# Patient Record
Sex: MALE | Race: ASIAN | NOT HISPANIC OR LATINO | Employment: FULL TIME | URBAN - METROPOLITAN AREA
[De-identification: names, ages, dates, MRNs, and addresses within clinical notes are randomized per-mention and may not be internally consistent; named-entity substitution may affect disease eponyms.]

---
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
## 2021-05-31 VITALS — WEIGHT: 116 LBS | BODY MASS INDEX: 19.33 KG/M2 | HEIGHT: 65 IN

## 2021-06-12 NOTE — PROGRESS NOTES
Right knee problem five years ago.   Track meet.  Spikes caught on block. Rich and felt a crack when pushing off.  Right knee.  Was last in the race.  Jogged at 20 percent.   Was normal after the meet.  Since, has not run full speed.  Hurts at full speed and stand for longer than 6 hours will hurt.  Denies inflammation.  Denies locking.  Never give way.  Tried running [place] and had to stop.    Now in college    ROS: as noted above    OBJECTIVE:   Vitals:    08/21/17 1517   BP: 132/62   Pulse: 66   Resp: 20   Temp: 98.2  F (36.8  C)      Head: atraumatic   Eyes: nl eom, anicteric   Ears: nl external ears   Neck: nl nodes, supple   Lungs: clear to ausc   Heart: regular rhythm  Back: no tenderness  Abd: soft nontender   Joints: uninflamed   Ext: nontender calves   Mental: euthymic  Neuro: no weakness  Gait: normal  Knee full rom  No effusion or laxity or erythema  Neg meniscal signs  Xray ordered and interpreted personally: no acute.  ? Posterior ossicle could be old  ASSESSMENT/PLAN:    1. Chronic pain of right knee  Ambulatory referral to PT/OT    XR Knee Right 1 or 2 VWS   2. Immunization due  HPV vaccine 9 valent 3 dose IM     Suggest physical therapy.  follow up if not resolved sufficiently

## 2021-06-12 NOTE — PROGRESS NOTES
[facility]   Knee Initial Evaluation    Patient Name: Chepe Pryor  Date of evaluation: 8/25/2017  Referral Diagnosis: Chronic right knee pain  Referring provider: Jani Hendrickson MD  Visit Diagnosis:     ICD-10-CM    1. Chronic pain of right knee M25.561     G89.29    2. Muscle weakness (generalized) M62.81        Assessment:      Impairments in  pain, posture, ROM, joint mobility, strength  The POC is dynamic and will be modified on an ongoing basis.  Barriers to achieving goals as noted in the assessment section may affect outcome.  Prognosis to achieve goals is  good   Pt. is appropriate for skilled PT intervention as outlined in the Plan of Care (POC).  Pt. is a good candidate for skilled PT services to improve pain levels and function.     Chepe Pryor is a 17 y.o. 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 Not getting better and not getting worse. Patient presents with mild weakness of the proximal muscles of the LEs and positive mensical tear testing. Patient will benefit from skilled PT intervention to increase strength and stabilization to decrease pain and prevent reoccurrence of pain or injury.     Goals:  Pt. will be independent with home exercise program in : 6 weeks  Patient will ascend / descend: stairs;without railing;with recipricol gait;with less pain;with less difficulty  Pt will: will be able to return to jogging short distances wihtout increase in pain in 8 weeks  Pt will: be able to perform a deep knee bend without increase in knee pain for ADLs and self cares in 8 weks    Patient's expectations/goals are realistic.    Barriers to Learning or Achieving Goals:  Chronicity of the problem.  Co-morbidities or other medical factors.  none       Plan / Patient Instructions:       Plan of Care:   Authorization / Certification Start Date: 08/25/17  Communication with: Referral Source;Patient Caregiver  Patient Related Instruction: Nature of Condition;Treatment plan and rationale;Self Care instruction;Basis of treatment;Body mechanics;Posture;Precautions;Next steps;Expected outcome  Times per Week: 1  Number of Weeks: 8   Number of Visits: up to 8 sessions  Therapeutic Exercise: ROM;Stretching;Strengthening  Neuromuscular Reeducation: kinesio tape;posture;core;balance/proprioception  Manual Therapy: soft tissue mobilization;myofascial release;joint mobilization;muscle energy  Modalities: ultrasound;cold pack;hot pack;TENS;electrical stimulation    Treatment techniques, plan of care, and goals were discussed with the patient. The patient agrees to the plan as outlined. The plan of care is dynamic and will be modified on an ongoing basis.    Plan for next visit: review meniscal testing again, progress quad and gluteal strengthening exercises.      Subjective:        Social information:   Living Situation:single family home and lives with others    Occupation:[employer]   Work Status:Working part time   Equipment Available: None    History of Present Illness:    Chepe is a 17 y.o. male who presents to therapy today with complaints of chronic right knee pain that started 5 years ago when running at a track and field event. When he went to push off of the starting blocks at the beginning of the race he felt a pop or a crack like a knuckle cracking. He finished the race but only was able to run at 50% speed. The next year in track he did try to participate but it the knee would increase in pain with running activities, the next two years he tried to play tennis at school and would miss practices because of knee pain. The pain currently comes and goes, pain is there after standing for longer periods of time like at work, climbing up and down steps, trying to run especially when running on hills.  Patient would like to return to normal and be able to do all activities without pain.      Pain Ratin  Pain rating at best: 0  Pain rating at worst: 7  Pain description:aching, burning and soreness    Functional limitations are described as occurring with:   balance  bending  lifting    Patient reports benefit from:  rest  , anti-inflammatory, pain medication, massage       Objective:      Note: Items left blank indicates the item was not performed or not indicated at the time of the evaluation.    Knee Examination  1. Chronic pain of right knee     2. Muscle weakness (generalized)       Precautions/Restrictions:  None  Involved Side: Right  Posture Observation:      General standing posture is normal.  Assistive Device: None  Gait Observation: normal   Lumbar Clearing: Does not provoke symptoms  Hip Clearing: Does not provoke symptoms    Knee ROM     Date:  2017    AROM in degrees  Right   Left  Right   Left  Right   Left       Knee Flexion  (130 )   WNL WNL                     Knee Extension  (0 )   WNL   WNL                 PROM in degrees  Right   Left  Right   Left  Right   Left       Knee Flexion  (130 )   WNL  WNL                    Knee Extension  (0 )   WNL   WNL                 LE Strength                  Date:  2017   Strength (MMT/5)  Right   Left  Right   Left  Right   Left       Hip Flexion   4+ 4+                     Hip Abduction   4 4                     Hip Adduction   4+   4+                   Hip Extension   4 4+                     Hip Internal Rotation   5   5                   Hip External Rotation   5 5                     Knee Extension   5   5                   Knee Flexion   5 5                     Ankle Dorsiflexion   5   5                   Ankle Plantarflexion   5 5                   Flexibility:  Min tightness in bilateral hamstrings     Palpation:  Min tenderness to medial joint line, no effusion noted in the knee today    Knee Special Tests (+/-):  2017    Knee OA  Cluster   Right   Left   Ligament Tests   Right   Left    1. > 51 y/o   -        Lachman   -       2. Stiffness > 30 min.   -        Anterior Drawer   -       3. Crepitus   -        Posterior Drawer   -       4. Bony tenderness   -        Posterior Sag   -       5. Bone enlargement   -        Valgus Stress   -       6. No warmth to the touch           Varus Stress   -        Meniscal Tests   Right   Left    Other   Right    Left       Cece's   + medial        Ely's             Joint line tenderness   + medial        Key             Thessaly   Did not test        Peyman             Apley's   + medial        Bounce Home test  -         PT Evaluation Code: (Please list factors)  Patient History/Comorbidities: as above   Examination: as above   Clinical Presentation: stable   Clinical Decision Making: low     Patient History/  Comorbidities Examination  (body structures and functions, activity limitations, and/or participation restrictions) Clinical Presentation Clinical Decision Making (Complexity)   No documented Comorbidities or personal factors 1-2 Elements Stable and/or uncomplicated Low   1-2 documented comorbidities or personal factor 3 Elements Evolving clinical presentation with changing characteristics Moderate   3-4 documented comorbidities or personal factors 4 or more Unstable and unpredictable High       Treatment Today   8/25/2017  TREATMENT MINUTES COMMENTS   Evaluation 30    Self-care/ Home management     Manual therapy     Neuromuscular Re-education     Therapeutic Activity     Therapeutic Exercises 15 Discussed clinical findings and meniscal testing information.   Initiated HEP and discussed POC  HEP with handout given :   - SLR x 10 reps  - bridges x 10 reps, added L3 band for resistance across hips  - clam shells x 10 reps B  - wall squats 10 sec hold x 10 reps    Gait training     Modality__________________                Total 45     Blank areas are intentional and mean the treatment did not  include these items.     Savi Remy, PT, DPT   8/25/2017  2:37 PM

## 2021-06-12 NOTE — PROGRESS NOTES
[facility] Discharge Summary  Patient Name: Chepe Pryor  Date: 11/2/2017  Referral Diagnosis: Chronic Knee Pain   Referring provider: Jani Hendrickson MD  Visit Diagnosis:   1. Chronic pain of right knee     2. Muscle weakness (generalized)         Goals:  Pt. will be independent with home exercise program in : 6 weeks  Patient will ascend / descend: stairs;without railing;with recipricol gait;with less pain;with less difficulty  Pt will: will be able to return to jogging short distances wihtout increase in pain in 8 weeks  Pt will: be able to perform a deep knee bend without increase in knee pain for ADLs and self cares in 8 weks    Patient was seen for 2 visits from 8/25/17 to 9/5/17 with 0 missed appointments.  The patient reports feeling better and did not wish to schedule further therapy at this time.  The patient discontinued therapy, did not return.  No further therapy is required at this time.   At the time of the last session the patient thought the knee pain was improving overall and he had began to return to exercise and activities as normal. Patient did not return following last session and will now be discharged from therapy.     Therapy will be discontinued at this time.  The patient will need a new referral to resume.    Thank you for your referral.  Savi Remy  11/2/2017  7:16 AM    [facility] Daily Progress     Patient Name: Chepe Pryor  Date: 9/5/2017  Visit #: 2  PTA visit #:    Date of evaluation: 8/25/2017  Referral Diagnosis: Chronic right knee pain  Referring provider: Jani Hendrickson MD  Visit Diagnosis:     ICD-10-CM    1. Chronic pain of right knee M25.561     G89.29    2. Muscle weakness (generalized) M62.81      Initial Evaluation   Chepe Pryor is a 17 y.o. 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 Not getting better and not getting worse. Patient presents with mild weakness of the proximal muscles of the LEs and positive mensical tear testing. Patient will benefit from skilled PT intervention to increase strength and stabilization to decrease pain and prevent reoccurrence of pain or injury    Assessment:     HEP/POC compliance is  good .  Patient demonstrates understanding/independence with home program.  Patient is benefitting from skilled physical therapy and is making steady progress toward functional goals.  Patient is appropriate to continue with skilled physical therapy intervention, as indicated by initial plan of care.    Goal Status:  Pt. will be independent with home exercise program in : 6 weeks  Patient will ascend / descend: stairs;without railing;with recipricol gait;with less pain;with less difficulty  Pt will: will be able to return to jogging short distances wihtout increase in pain in 8 weeks  Pt will: be able to perform a deep knee bend without increase in knee pain for ADLs and self cares in 8 weks    Plan / Patient Education:     Continue with initial plan of care.  Progress with home program as tolerated.   Follow up in 2 weeks, progress hip and core exercises as able, add eccentric step downs, lunges and core exercises as tolerated.     Subjective:     Pain Ratin  Patient reports he has been doing well. He feels with doing the exercises he has been able to walk longer distances without pain in the knee. He has not been able to run around [place] yet but feels he could run slowly for a shorter distance at this time.       Objective:     Able to complete wall slides to 90 degree knee bend without pain.     Treatment Today   2017  TREATMENT MINUTES COMMENTS   Evaluation     Self-care/ Home management     Manual therapy     Neuromuscular Re-education     Therapeutic Activity     Therapeutic Exercises 27 Bike WL3 x 5 min  - SLR x 15  reps  - Bridges x 5 reps - advanced to bridge with leg extension L3 band 5 sec hold x 10 reps alternating sides    - clam shells x 15 reps B   - quadruped fire hydrants x 10 rep B  - quadruped alternating leg extension x 10 reps B   - quadruped arm/leg lifts alternating sides x 10 reps B  - wall slides 10 sec hold x 10 reps      Gait training     Modality__________________                Total 27    Blank areas are intentional and mean the treatment did not include these items.     Savi Remy, PT, DPT   9/5/2017

## 2022-12-22 ENCOUNTER — TRANSFERRED RECORDS (OUTPATIENT)
Dept: HEALTH INFORMATION MANAGEMENT | Facility: CLINIC | Age: 22
End: 2022-12-22

## 2022-12-22 ENCOUNTER — MEDICAL CORRESPONDENCE (OUTPATIENT)
Dept: HEALTH INFORMATION MANAGEMENT | Facility: CLINIC | Age: 22
End: 2022-12-22

## 2022-12-26 DIAGNOSIS — R07.9 CHEST PAIN: ICD-10-CM

## 2022-12-26 DIAGNOSIS — I45.10 RIGHT BUNDLE BRANCH BLOCK: Primary | ICD-10-CM
